# Patient Record
Sex: FEMALE | Race: OTHER
[De-identification: names, ages, dates, MRNs, and addresses within clinical notes are randomized per-mention and may not be internally consistent; named-entity substitution may affect disease eponyms.]

---

## 2020-11-15 ENCOUNTER — HOSPITAL ENCOUNTER (EMERGENCY)
Dept: HOSPITAL 61 - ER | Age: 40
Discharge: HOME | End: 2020-11-15
Payer: SELF-PAY

## 2020-11-15 VITALS — BODY MASS INDEX: 35.41 KG/M2 | WEIGHT: 180.34 LBS | HEIGHT: 60 IN

## 2020-11-15 VITALS — DIASTOLIC BLOOD PRESSURE: 85 MMHG | SYSTOLIC BLOOD PRESSURE: 121 MMHG

## 2020-11-15 DIAGNOSIS — H10.33: Primary | ICD-10-CM

## 2020-11-15 PROCEDURE — 99283 EMERGENCY DEPT VISIT LOW MDM: CPT

## 2020-11-15 NOTE — PHYS DOC
General Adult


EDM:


Chief Complaint:  EYE PROBLEMS





HPI:


HPI:





Patient is a 40  year old female who presents with Bilateral eye irritation, 

discharge, itching and pain x 2 days. Patient denies fever, recent illness, 

nausea, vomiting, vision changes, headache, dizziness, chest pain, soa. Rates 

her discomfort at a 8/10.





Review of Systems:


Review of Systems:


Constitutional:   Denies fever or chills. []


Eyes:   Denies change in visual acuity. + Eye discharge, irritation, redness, 

itching []


HENT:   Denies nasal congestion or sore throat. [] 


Respiratory:   Denies cough or shortness of breath. [] 


Cardiovascular:   Denies chest pain or edema. [] 


GI:   Denies abdominal pain, nausea, vomiting, bloody stools or diarrhea. [] 


:  Denies dysuria. [] 


Musculoskeletal:   Denies back pain or joint pain. [] 


Integument:   Denies rash. [] 


Neurologic:   Denies headache, focal weakness or sensory changes. [] 


Endocrine:   Denies polyuria or polydipsia. [] 


Lymphatic:  Denies swollen glands. [] 


Psychiatric:  Denies depression or anxiety. []





Heart Score:


Risk Factors:


Risk Factors:  DM, Current or recent (<one month) smoker, HTN, HLP, family 

history of CAD, obesity.


Risk Scores:


Score 0 - 3:  2.5% MACE over next 6 weeks - Discharge Home


Score 4 - 6:  20.3% MACE over next 6 weeks - Admit for Clinical Observation


Score 7 - 10:  72.7% MACE over next 6 weeks - Early Invasive Strategies





Physical Exam:


PE:





Constitutional: Well developed, well nourished, no acute distress, non-toxic 

appearance. []


HENT: Normocephalic, atraumatic, bilateral external ears normal, oropharynx 

moist, no oral exudates, nose normal. []


Eyes: PERRLA, EOMI, bilateral conjunctiva pink, sclera white discharge.  1+ 

swelling to the lids [] 


Neck: Normal range of motion, no tenderness, supple, no stridor. [] 


Cardiovascular:Heart rate regular rhythm, no murmur []


Lungs & Thorax:  Bilateral breath sounds clear to auscultation []


Abdomen: Bowel sounds normal, soft, no tenderness, no masses, no pulsatile 

masses. [] 


Skin: Warm, dry, no erythema, no rash. [] 


Back: No tenderness, no CVA tenderness. [] 


Extremities: No tenderness, no cyanosis, no clubbing, ROM intact, no edema. [] 


Neurologic: Alert and oriented X 3, normal motor function, normal sensory 

function, no focal deficits noted. []


Psychologic: Affect normal, judgement normal, mood normal. []





EKG:


EKG:


[]





Radiology/Procedures:


Radiology/Procedures:


[]





Course & Med Decision Making:


Course & Med Decision Making


Pertinent Labs and Imaging studies reviewed. (See chart for details)





See HPI.  Patient denies any injury to her eyes.  Bilateral conjunctivae of 

pinkness with clear white discharge and 1+ swelling.  No cellulitis.  PERRLA.  

Patient states she sees out of her eyes as normal.  No pain with eye movements. 

 Patient will be given antibiotic ointment for her eyes.





[]





Dragon Disclaimer:


Dragon Disclaimer:


This electronic medical record was generated, in whole or in part, using a voice

 recognition dictation system.





Departure


Departure


Impression:  


   Primary Impression:  


   Conjunctivitis


   Qualified Codes:  H10.33 - Unspecified acute conjunctivitis, bilateral


Disposition:  01 DC HOME SELF CARE/HOMELESS


Condition:  STABLE


Patient Instructions:  Conjunctivitis (Viral and Bacterial)





Additional Instructions:  


Follow-up with your primary care or a eye doctor if needed.  Use eyedrops as 

directed.  Remember this is very contagious.


Scripts


Polymyxin B Sulf/Trimethoprim (POLYMYXIN B-TMP EYE DROPS) 10 Ml Drops


1 DROP EACHEYE QID for 7 Days, #10 ML 0 Refills


   Prov: NEAL SCHWAB         11/15/20











NEAL SCHWAB            Nov 15, 2020 17:08